# Patient Record
Sex: MALE | Race: BLACK OR AFRICAN AMERICAN | NOT HISPANIC OR LATINO | URBAN - METROPOLITAN AREA
[De-identification: names, ages, dates, MRNs, and addresses within clinical notes are randomized per-mention and may not be internally consistent; named-entity substitution may affect disease eponyms.]

---

## 2020-12-25 ENCOUNTER — EMERGENCY (EMERGENCY)
Facility: HOSPITAL | Age: 20
LOS: 0 days | Discharge: HOME | End: 2020-12-26
Attending: EMERGENCY MEDICINE | Admitting: EMERGENCY MEDICINE
Payer: SELF-PAY

## 2020-12-25 VITALS
DIASTOLIC BLOOD PRESSURE: 84 MMHG | RESPIRATION RATE: 18 BRPM | HEART RATE: 90 BPM | OXYGEN SATURATION: 100 % | TEMPERATURE: 98 F | SYSTOLIC BLOOD PRESSURE: 142 MMHG

## 2020-12-25 VITALS
RESPIRATION RATE: 18 BRPM | HEART RATE: 101 BPM | TEMPERATURE: 99 F | OXYGEN SATURATION: 97 % | SYSTOLIC BLOOD PRESSURE: 130 MMHG | WEIGHT: 119.93 LBS | DIASTOLIC BLOOD PRESSURE: 92 MMHG | HEIGHT: 69 IN

## 2020-12-25 DIAGNOSIS — M79.645 PAIN IN LEFT FINGER(S): ICD-10-CM

## 2020-12-25 DIAGNOSIS — L03.114 CELLULITIS OF LEFT UPPER LIMB: ICD-10-CM

## 2020-12-25 LAB
ALBUMIN SERPL ELPH-MCNC: 4.7 G/DL — SIGNIFICANT CHANGE UP (ref 3.5–5.2)
ALP SERPL-CCNC: 105 U/L — SIGNIFICANT CHANGE UP (ref 30–115)
ALT FLD-CCNC: 61 U/L — HIGH (ref 0–41)
ANION GAP SERPL CALC-SCNC: 14 MMOL/L — SIGNIFICANT CHANGE UP (ref 7–14)
AST SERPL-CCNC: 42 U/L — HIGH (ref 0–41)
BASOPHILS # BLD AUTO: 0.07 K/UL — SIGNIFICANT CHANGE UP (ref 0–0.2)
BASOPHILS NFR BLD AUTO: 0.5 % — SIGNIFICANT CHANGE UP (ref 0–1)
BILIRUB SERPL-MCNC: <0.2 MG/DL — SIGNIFICANT CHANGE UP (ref 0.2–1.2)
BUN SERPL-MCNC: 15 MG/DL — SIGNIFICANT CHANGE UP (ref 10–20)
CALCIUM SERPL-MCNC: 9.4 MG/DL — SIGNIFICANT CHANGE UP (ref 8.5–10.1)
CHLORIDE SERPL-SCNC: 100 MMOL/L — SIGNIFICANT CHANGE UP (ref 98–110)
CO2 SERPL-SCNC: 23 MMOL/L — SIGNIFICANT CHANGE UP (ref 17–32)
CREAT SERPL-MCNC: 1 MG/DL — SIGNIFICANT CHANGE UP (ref 0.7–1.5)
EOSINOPHIL # BLD AUTO: 0.69 K/UL — SIGNIFICANT CHANGE UP (ref 0–0.7)
EOSINOPHIL NFR BLD AUTO: 4.9 % — SIGNIFICANT CHANGE UP (ref 0–8)
GLUCOSE SERPL-MCNC: 101 MG/DL — HIGH (ref 70–99)
HCT VFR BLD CALC: 45.9 % — SIGNIFICANT CHANGE UP (ref 42–52)
HGB BLD-MCNC: 15.9 G/DL — SIGNIFICANT CHANGE UP (ref 14–18)
IMM GRANULOCYTES NFR BLD AUTO: 0.4 % — HIGH (ref 0.1–0.3)
LYMPHOCYTES # BLD AUTO: 16.4 % — LOW (ref 20.5–51.1)
LYMPHOCYTES # BLD AUTO: 2.32 K/UL — SIGNIFICANT CHANGE UP (ref 1.2–3.4)
MCHC RBC-ENTMCNC: 33.8 PG — HIGH (ref 27–31)
MCHC RBC-ENTMCNC: 34.6 G/DL — SIGNIFICANT CHANGE UP (ref 32–37)
MCV RBC AUTO: 97.5 FL — HIGH (ref 80–94)
MONOCYTES # BLD AUTO: 1.28 K/UL — HIGH (ref 0.1–0.6)
MONOCYTES NFR BLD AUTO: 9 % — SIGNIFICANT CHANGE UP (ref 1.7–9.3)
NEUTROPHILS # BLD AUTO: 9.76 K/UL — HIGH (ref 1.4–6.5)
NEUTROPHILS NFR BLD AUTO: 68.8 % — SIGNIFICANT CHANGE UP (ref 42.2–75.2)
NRBC # BLD: 0 /100 WBCS — SIGNIFICANT CHANGE UP (ref 0–0)
PLATELET # BLD AUTO: 253 K/UL — SIGNIFICANT CHANGE UP (ref 130–400)
POTASSIUM SERPL-MCNC: 4.5 MMOL/L — SIGNIFICANT CHANGE UP (ref 3.5–5)
POTASSIUM SERPL-SCNC: 4.5 MMOL/L — SIGNIFICANT CHANGE UP (ref 3.5–5)
PROT SERPL-MCNC: 7.3 G/DL — SIGNIFICANT CHANGE UP (ref 6–8)
RBC # BLD: 4.71 M/UL — SIGNIFICANT CHANGE UP (ref 4.7–6.1)
RBC # FLD: 12.3 % — SIGNIFICANT CHANGE UP (ref 11.5–14.5)
SODIUM SERPL-SCNC: 137 MMOL/L — SIGNIFICANT CHANGE UP (ref 135–146)
WBC # BLD: 14.18 K/UL — HIGH (ref 4.8–10.8)
WBC # FLD AUTO: 14.18 K/UL — HIGH (ref 4.8–10.8)

## 2020-12-25 PROCEDURE — 10061 I&D ABSCESS COMP/MULTIPLE: CPT

## 2020-12-25 PROCEDURE — 99284 EMERGENCY DEPT VISIT MOD MDM: CPT | Mod: 25

## 2020-12-25 PROCEDURE — 73140 X-RAY EXAM OF FINGER(S): CPT | Mod: 26,LT

## 2020-12-25 RX ORDER — KETOROLAC TROMETHAMINE 30 MG/ML
15 SYRINGE (ML) INJECTION ONCE
Refills: 0 | Status: DISCONTINUED | OUTPATIENT
Start: 2020-12-25 | End: 2020-12-25

## 2020-12-25 RX ADMIN — Medication 100 MILLIGRAM(S): at 21:33

## 2020-12-25 RX ADMIN — Medication 15 MILLIGRAM(S): at 21:33

## 2020-12-25 NOTE — ED PROVIDER NOTE - CARE PROVIDERS DIRECT ADDRESSES
[email or web address]

## 2020-12-25 NOTE — ED PROVIDER NOTE - PROGRESS NOTE DETAILS
ortho c/s pending recs. will draw labs, antibiose, and xray in meanwhile -[facility] ortho resident at bedside,   Dr. Chung I/D and advised pt to stay for treatment, pt prefers to go home   I had extensive discussion of Risks/Alternatives/Benefits of pursuing further medical evaluation and/or care with patient and any available family/friends; patient still electing to leave against medical advice. Patient is awake, alert, oriented and demonstrates full capacity and insight into illness. Patient aware and encouraged to return immediately to ED or nearest ED if patient decides to change mind regarding care or if patient experiences any new, worsening, or concerning symptoms. BI: pt endorsed to me by Dr. Chung. Pt incised and drained. Dressed. Ortho rec clinda which was sent.

## 2020-12-25 NOTE — ED PROVIDER NOTE - PATIENT PORTAL LINK FT
You can access the FollowMyHealth Patient Portal offered by [facility] by registering at the following website: [email or web address]. By joining [employer]’s FollowMyHealth portal, you will also be able to view your health information using other applications (apps) compatible with our system.

## 2020-12-25 NOTE — ED PROVIDER NOTE - NSFOLLOWUPINSTRUCTIONS_ED_ALL_ED_FT
Paronychia    Paronychia is an infection of the skin that surrounds a nail. It usually affects the skin around a fingernail, but it may also occur near a toenail. It often causes pain and swelling around the nail. In some cases, a collection of pus (abscess) can form near or under the nail.     This condition may develop suddenly, or it may develop gradually over a longer period. In most cases, paronychia is not serious, and it will clear up with treatment.    What are the causes?  This condition may be caused by bacteria or a fungus. These germs can enter the body through an opening in the skin, such as a cut or a hangnail.    What increases the risk?  This condition is more likely to develop in people who:  Get their hands wet often, such as those who work as dishwashers, , or nurses.  Bite their fingernails or suck their thumbs.  Trim their nails very short.  Have hangnails or injured fingertips.  Get manicures.  Have diabetes.    What are the signs or symptoms?  Symptoms of this condition include:  Redness and swelling of the skin near the nail.  Tenderness around the nail when you touch the area.  Pus-filled bumps under the skin at the base and sides of the nail (cuticle).  Fluid or pus under the nail.  Throbbing pain in the area.  How is this diagnosed?  This condition is diagnosed with a physical exam. In some cases, a sample of pus may be tested to determine what type of bacteria or fungus is causing the condition.    How is this treated?  Treatment depends on the cause and severity of your condition. If your condition is mild, it may clear up on its own in a few days or after soaking in warm water. If needed, treatment may include:  Antibiotic medicine, if your infection is caused by bacteria.  Antifungal medicine, if your infection is caused by a fungus.  A procedure to drain pus from an abscess.  Anti-inflammatory medicine (corticosteroids).  Follow these instructions at home:  Wound care     Keep the affected area clean.  Soak the affected area in warm water, if told to do so by your health care provider. You may be told to do this for 20 minutes, 2–3 times a day.   Keep the area dry when you are not soaking it.  Do not try to drain an abscess yourself.  Follow instructions from your health care provider about how to take care of the affected area. Make sure you:  Wash your hands with soap and water before you change your bandage (dressing). If soap and water are not available, use hand .  Change your dressing as told by your health care provider.  If you had an abscess drained, check the area every day for signs of infection. Check for:  Redness, swelling, or pain.  Fluid or blood.  Warmth.  Pus or a bad smell.  Medicines       Take over-the-counter and prescription medicines only as told by your health care provider.  If you were prescribed an antibiotic medicine, take it as told by your health care provider. Do not stop taking the antibiotic even if you start to feel better.  General instructions     Avoid contact with harsh chemicals.  Do not pick at the affected area.  Prevention     To prevent this condition from happening again:  Wear rubber gloves when washing dishes or doing other tasks that require your hands to get wet.  Wear gloves if your hands might come in contact with  or other chemicals.  Avoid injuring your nails or fingertips.  Do not bite your nails or tear hangnails.  Do not cut your nails very short.   Do not cut your cuticles.  Use clean nail clippers or scissors when trimming nails.  Contact a health care provider if:  Your symptoms get worse or do not improve with treatment.  You have continued or increased fluid, blood, or pus coming from the affected area.  Your finger or knuckle becomes swollen or difficult to move.  Get help right away if you have:  A fever or chills.  Redness spreading away from the affected area.  Joint or muscle pain.    Summary  Paronychia is an infection of the skin that surrounds a nail. It often causes pain and swelling around the nail. In some cases, a collection of pus (abscess) can form near or under the nail.  This condition may be caused by bacteria or a fungus. These germs can enter the body through an opening in the skin, such as a cut or a hangnail.  If your condition is mild, it may clear up on its own in a few days. If needed, treatment may include medicine or a procedure to drain pus from an abscess.  To prevent this condition from happening again, wear gloves if doing tasks that require your hands to get wet or to come in contact with chemicals. Also avoid injuring your nails or fingertips.

## 2020-12-25 NOTE — ED PROVIDER NOTE - CARE PROVIDER_API CALL
Miroslava Henry)  [facility]  [address] [place] [address], [address]  [place] [address]  Phone: [phone]  Fax: [phone]  Follow Up Time: 1-3 Days

## 2020-12-25 NOTE — ED PROVIDER NOTE - OBJECTIVE STATEMENT
20y M no pmh presenting with swelling and pain to L 2nd finger x2 weeks. Pt works in seafood industry. Can't recall foreign body or trauma to finger. Pain with passive and active movement of finger. Has not tried any interventions to reduce symptoms. No f/c/n/v. No numbness/tingling. No discharge.

## 2020-12-25 NOTE — CONSULT NOTE ADULT - ASSESSMENT
Orthopaedic Surgery Consult Note    Phone number: [phone]    19 yo Male RHD presents w/ complaint of left ring finger pain. Pt reports he noticed some irritation of his ring finger approx 2 weeks ago, but it became significantly worse over the last day or 2. Has not had any treatment for it. 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 Denies fever/chills. Denies numbness/tingling in the ring finger.    PMH/PSH: denies  Medications: denies  All: NKDA        T(C): 37.1 (12-25-20 @ 19:25), Max: 37.1 (12-25-20 @ 19:25)  HR: 76 (12-25-20 @ 23:08) (76 - 101)  BP: 130/92 (12-25-20 @ 19:25) (130/92 - 130/92)  RR: 18 (12-25-20 @ 19:25) (18 - 18)  SpO2: 97% (12-25-20 @ 19:25) (97% - 97%)    P/E:  NAD  Non-labored breathing    Right Hand  Ring finger:  Skin intact  Nail changes from biting nails  Swelling of ring finger; no fusiform swelling/erythema over dorsal ring finger   Ring finger resting in extension  TTP over dorsal ring finger; non-TTP over palmar side of ring finger/non-TTP over flexor sheath  Palpable fluctuance over dorsal ring finger  No pain w/ passive motion of ring finger  SILT radial and ulnar aspects of ring finger  AIN/PIN/U intact  Cap refill <2        Labs                        15.9   14.18 )-----------( 253      ( 25 Dec 2020 21:12 )             45.9     12-25    137  |  100  |  15  ----------------------------<  101<H>  4.5   |  23  |  1.0    Ca    9.4      25 Dec 2020 21:12    TPro  7.3  /  Alb  4.7  /  TBili  <0.2  /  DBili  x   /  AST  42<H>  /  ALT  61<H>  /  AlkPhos  105  12-25    LIVER FUNCTIONS - ( 25 Dec 2020 21:12 )  Alb: 4.7 g/dL / Pro: 7.3 g/dL / ALK PHOS: 105 U/L / ALT: 61 U/L / AST: 42 U/L / GGT: x               Imaging:  XR: left hand: no acute fx's or dislocations; swelling seen over ring finger      A/P:  19 yo Male w/ left ring finger paronychia. Pt received a dose of iv abx prior to being evaluated by ortho. Pt ring finger of I&D'd by ED resident w/ ortho present. Large amount of purulence expressed. Cultures obtained and sent. Small wick placed. Soft dressing applied. Recommended pt stay in hospital for another dose of iv abx and to be monitored for improvement of symptoms. Pt expressed he does not want to be admitted, but agreed to stay in observation for a few hours.     If d/c'd, pt can pull wick out of finger tomorrow and begin warm soapy soaks TID; discussed and explained to pt what he needs to do.  Warm soapy soaks TID and soft dressing place afterwards  PO abx for 7 days  Pain control  Elevate hand  F/u w/ Dr. Henry next week  If d/c'd, pt instructed to return to ED w/ uncontrolled pain/bleeding/fever/chills/numbness/tingling/cool extremity/inability to move extremity             Orthopaedic Surgery Consult Note    Phone number: [phone]    21 yo Male RHD presents w/ complaint of left ring finger pain. Pt reports he noticed some irritation of his ring finger approx 2 weeks ago, but it became significantly worse over the last day or 2. Has not had any treatment for it. 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 Denies fever/chills. Denies numbness/tingling in the ring finger.    PMH/PSH: denies  Medications: denies  All: NKDA        T(C): 37.1 (12-25-20 @ 19:25), Max: 37.1 (12-25-20 @ 19:25)  HR: 76 (12-25-20 @ 23:08) (76 - 101)  BP: 130/92 (12-25-20 @ 19:25) (130/92 - 130/92)  RR: 18 (12-25-20 @ 19:25) (18 - 18)  SpO2: 97% (12-25-20 @ 19:25) (97% - 97%)    P/E:  NAD  Non-labored breathing    Right Hand  Ring finger:  Skin intact  Nail changes from biting nails  Swelling of ring finger; no fusiform swelling/erythema over dorsal ring finger   Ring finger resting in extension  TTP over dorsal ring finger; non-TTP over palmar side of ring finger/non-TTP over flexor sheath  Palpable fluctuance over dorsal ring finger  No pain w/ passive motion of ring finger  SILT radial and ulnar aspects of ring finger  AIN/PIN/U intact  Cap refill <2        Labs                        15.9   14.18 )-----------( 253      ( 25 Dec 2020 21:12 )             45.9     12-25    137  |  100  |  15  ----------------------------<  101<H>  4.5   |  23  |  1.0    Ca    9.4      25 Dec 2020 21:12    TPro  7.3  /  Alb  4.7  /  TBili  <0.2  /  DBili  x   /  AST  42<H>  /  ALT  61<H>  /  AlkPhos  105  12-25    LIVER FUNCTIONS - ( 25 Dec 2020 21:12 )  Alb: 4.7 g/dL / Pro: 7.3 g/dL / ALK PHOS: 105 U/L / ALT: 61 U/L / AST: 42 U/L / GGT: x               Imaging:  XR: left hand: no acute fx's or dislocations; swelling seen over ring finger      A/P:  21 yo Male w/ left ring finger paronychia. Pt had no pain over flexor sheath, no fusiform swelling, no pain w/ passive stretch, and finger resting in extension. Pt received a dose of iv abx prior to being evaluated by ortho. Pt ring finger of I&D'd by ED resident w/ ortho present. Large amount of purulence expressed. Cultures obtained and sent. Small wick placed. Soft dressing applied. Recommended pt stay in hospital for another dose of iv abx and to be monitored for improvement of symptoms. Pt expressed he does not want to be admitted, but agreed to stay in observation for a few hours. Would keep NPO while in the hospital.    If d/c'd, pt can pull wick out of finger tomorrow and begin warm soapy soaks TID; discussed and explained to pt what he needs to do.  Warm soapy soaks TID and soft dressing place afterwards  PO abx for 7 days  Pain control  Elevate hand  F/u w/ Dr. Henry next week  If d/c'd, pt instructed to return to ED w/ uncontrolled pain/bleeding/fever/chills/numbness/tingling/cool extremity/inability to move extremity

## 2020-12-25 NOTE — ED PROVIDER NOTE - NS ED ROS FT
Eyes:  No visual changes, eye pain or discharge.  ENMT:  No hearing changes, pain, no sore throat or runny nose, no difficulty swallowing  Cardiac:  No chest pain, SOB or edema. No chest pain with exertion.  Respiratory:  No cough or respiratory distress. No hemoptysis. No history of asthma or RAD.  GI:  No nausea, vomiting, diarrhea or abdominal pain.  :  No dysuria, frequency or burning.  MS:  see HPI  Neuro:  No headache or weakness.  No LOC.  Skin:  No skin rash.   Endocrine: No history of thyroid disease or diabetes.

## 2020-12-25 NOTE — ED PROVIDER NOTE - ATTENDING CONTRIBUTION TO CARE
20 M to ED with finger pain, redness swelling over dorsum of 2nd pip, dip, painful with flexion/extension and + fluctuance. Denies trauma or fall but had worked with seafood in the past.

## 2020-12-25 NOTE — ED PROVIDER NOTE - PHYSICAL EXAMINATION
CONSTITUTIONAL: Well-developed; well-nourished; in no acute distress.   SKIN: warm, dry  HEAD: Normocephalic; atraumatic.  EYES: no conjunctival injection. PERRL.   ENT: No nasal discharge; airway clear.  NECK: Supple; non tender.  CARD: S1, S2 normal; no murmurs, gallops, or rubs. Regular rate and rhythm.   RESP: No wheezes, rales or rhonchi.  ABD: soft ntnd  EXT: diffusely swollen l 2nd finger, pain with active and passive extension and flexion of finger.   LYMPH: No acute cervical adenopathy.  NEURO: Alert, oriented, grossly unremarkable  PSYCH: Cooperative, appropriate.

## 2020-12-26 VITALS
RESPIRATION RATE: 20 BRPM | HEART RATE: 117 BPM | TEMPERATURE: 98 F | DIASTOLIC BLOOD PRESSURE: 89 MMHG | SYSTOLIC BLOOD PRESSURE: 153 MMHG | OXYGEN SATURATION: 100 % | WEIGHT: 130.07 LBS | HEIGHT: 69 IN

## 2020-12-26 VITALS
TEMPERATURE: 98 F | RESPIRATION RATE: 18 BRPM | SYSTOLIC BLOOD PRESSURE: 140 MMHG | DIASTOLIC BLOOD PRESSURE: 95 MMHG | OXYGEN SATURATION: 100 % | HEART RATE: 72 BPM
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
## 2020-12-26 DIAGNOSIS — Z79.899 OTHER LONG TERM (CURRENT) DRUG THERAPY: ICD-10-CM
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
## 2020-12-26 DIAGNOSIS — S52.502A UNSPECIFIED FRACTURE OF THE LOWER END OF LEFT RADIUS, INITIAL ENCOUNTER FOR CLOSED FRACTURE: ICD-10-CM

## 2020-12-26 DIAGNOSIS — M25.539 PAIN IN UNSPECIFIED WRIST: ICD-10-CM

## 2020-12-26 PROCEDURE — 73070 X-RAY EXAM OF ELBOW: CPT | Mod: 26,RT

## 2020-12-26 PROCEDURE — 73110 X-RAY EXAM OF WRIST: CPT | Mod: 26,RT

## 2020-12-26 PROCEDURE — 73130 X-RAY EXAM OF HAND: CPT | Mod: 26,RT

## 2020-12-26 PROCEDURE — 73090 X-RAY EXAM OF FOREARM: CPT | Mod: 26,RT,76

## 2020-12-26 PROCEDURE — 99284 EMERGENCY DEPT VISIT MOD MDM: CPT

## 2020-12-26 RX ORDER — OXYCODONE AND ACETAMINOPHEN 5; 325 MG/1; MG/1
1 TABLET ORAL ONCE
Refills: 0 | Status: DISCONTINUED | OUTPATIENT
Start: 2020-12-26 | End: 2020-12-26

## 2020-12-26 RX ADMIN — OXYCODONE AND ACETAMINOPHEN 1 TABLET(S): 5; 325 TABLET ORAL at 11:10

## 2020-12-26 RX ADMIN — OXYCODONE AND ACETAMINOPHEN 1 TABLET(S): 5; 325 TABLET ORAL at 14:01

## 2020-12-26 NOTE — ED PROVIDER NOTE - CLINICAL SUMMARY MEDICAL DECISION MAKING FREE TEXT BOX
Patient presented with left wrist pain s/p fall. Otherwise afebrile, HD stable, neurovascularly intact, but (+) deformity noted to left wrist. Obtained xrays which confirmed (+) distal radius fx. Consulted ortho who evaluated xrays and patient in ED and splinted patient. Recommended discharge with outpatient ortho follow up. Patient agreeable with plan and pain controlled in ED. Ambulatory, tolerates PO. Agrees to return to ED for any new or worsening symptoms.

## 2020-12-26 NOTE — ED PROVIDER NOTE - ATTENDING CONTRIBUTION TO CARE
20 year old male, denies pmhx, presenting s/p mechanical fall on outstretched hand. Denies head trauma or LOC but is currently complaining of left wrist pain described as sharp, non-radiating, worse with movement, relieved with rest, moderate severity. States he had a fracture of the left forearm in that region 6 years ago which has since healed. Otherwise denies other injuries or complaints.    Vital Signs: I have reviewed the initial vital signs.  Constitutional: NAD, well-nourished, appears stated age, no acute distress.  HEENT: Airway patent, moist MM, no erythema/swelling/deformity of oral structures. EOMI, PERRLA.  CV: regular rate, regular rhythm, well-perfused extremities, 2+ b/l DP and radial pulses equal.  Lungs: BCTA, no increased WOB.  ABD: NTND, no guarding or rebound, no pulsatile mass, no hernias.   MSK: Neck supple, nontender, nl ROM, no stepoff. Chest nontender. Back nontender in TLS spine or to b/l bony structures or flanks. (+) left wrist deformity, but other ext nontender, nl rom, no deformity.   INTEG: Skin warm, dry, no rash.  NEURO: A&Ox3, normal strength, nl sensation throughout, normal speech.   PSYCH: Calm, cooperative, normal affect and interaction.    Neurovascularly intact. Will obtain xrays, likely consult ortho, pain control, re-eval.

## 2020-12-26 NOTE — ED PROVIDER NOTE - NS ED ROS FT
Constitutional: (-) fever (-) malaise (-) diaphoresis (-) chills   Neck: (-) neck pain (-) neck stiffness  Cardiac: (-) chest pain  (-) palpitations (-) syncope (-) edema  Respiratory: (-) cough (-) hemoptysis (-) SOB (-) NEELY  GI: (-) nausea (-) vomiting (-) diarrhea (-) abdominal pain  MS: (-) back pain (-) myalgia (-) muscle weakness (-)  joint pain  Neuro: (-) headache (-) dizziness (-) numbness/tingling to extremities B/L (-) weakness (-) LOC (-) head injury   Skin: (-) rash (-) laceration (-)ecchymosis    Except as documented in the HPI, all other systems are negative.

## 2020-12-26 NOTE — ED PROVIDER NOTE - CARE PROVIDER_API CALL
Bertrand Mensah)  Orthopaedic Surgery  [address] [place] [address]  Phone: [phone]  Fax: [phone]  Follow Up Time: 4-6 Days    Justino Wen  ORTHOPAEDIC SURGERY  [address] [place] [address]  Phone: [phone]  Fax: [phone]  Follow Up Time: 4-6 Days

## 2020-12-26 NOTE — ED PROVIDER NOTE - PATIENT PORTAL LINK FT
You can access the FollowMyHealth Patient Portal offered by [facility] by registering at the following website: [email or web address]. By joining [employer]’s FollowMyHealth portal, you will also be able to view your health information using other applications (apps) compatible with our system.

## 2020-12-26 NOTE — CONSULT NOTE ADULT - SUBJECTIVE AND OBJECTIVE BOX
Orthopaedic Surgery Consult Note    JEN OBRIEN  [id number]    19yo Male with right forearm pain + deformtiy after slip and fall this AM. Pt reports that he was treated for I&D of the left RF yesterday for abscess. He fell this AM and broke his fall with his right hand. Denies any numbness / tingling. Does report history of fracture to the RUE in 2014 treated in cast, which he removed early. He has had some pain in the right forearm for the past few weeks. no pain in the other extremities    PMH/PSH  ^ARM INJURY    MEWS Score    No pertinent past medical history    ARM INJURY    0    [email or web address]        Medications  oxycodone    5 mG/acetaminophen 325 mG 1 Tablet(s) Oral Once      Allergies  No Known Allergies        T(C): 36.8 (12-26-20 @ 10:20), Max: 37.1 (12-25-20 @ 19:25)  HR: 117 (12-26-20 @ 10:20) (76 - 117)  BP: 153/89 (12-26-20 @ 10:20) (130/92 - 153/89)  RR: 20 (12-26-20 @ 10:20) (18 - 20)  SpO2: 100% (12-26-20 @ 10:20) (97% - 100%)    P/E:  AOx3, NAD  Non-labored breathing    RUE  Skin intact  Extension deformity at the distal forearm  Mild swelling, no ecchymosis  ROM limited 2/2 pain  TTP over distal forearm  Compartments soft and compressible, no pain w/ passive stretch of digits  SILT Ax/LABCN/R/M/U  AIN/PIN/U intact  Radial pulse 2+, cap refill <2      Labs                        15.9   14.18 )-----------( 253      ( 25 Dec 2020 21:12 )             45.9     12-25    137  |  100  |  15  ----------------------------<  101<H>  4.5   |  23  |  1.0    Ca    9.4      25 Dec 2020 21:12    TPro  7.3  /  Alb  4.7  /  TBili  <0.2  /  DBili  x   /  AST  42<H>  /  ALT  61<H>  /  AlkPhos  105  12-25    LIVER FUNCTIONS - ( 25 Dec 2020 21:12 )  Alb: 4.7 g/dL / Pro: 7.3 g/dL / ALK PHOS: 105 U/L / ALT: 61 U/L / AST: 42 U/L / GGT: x               Imaging:  XR Right forearm - displaced fracutre of the distal 1/3rd of the right radius with abundant callus formation consistent with chronic fracture    A/P:  19yo Male with right distal 1/3rd radius fracture s/p closed reduction and splinting. He was told that he will likely need surgery for this injury as an outpt. No further acute orthopedic planned. He can follow up in clinic to discuss surgery    Weight bearing: KATHLEEN GROSS in splint  Pain control  Ice/elevation  Cast/Splint care instructions provided  Return to clinic: Orthopaedic Fracture or Hand with Dr. Mensah / Dr. García / Dr. Wen, please call [phone] to schedule an appointment for next week  Continue care for LUE - pt is planning on starting TID warm soaks tomorrow. Educated about infection care

## 2020-12-26 NOTE — ED PROVIDER NOTE - OBJECTIVE STATEMENT
19 yo M presenting to the ED c/o right wrist pain x few hours s/p mechanical fall on outstretched hand, pt was in the dark and fell forward breaking his fall with his right hand. Pt took Tylenol earlier this am, no relief of pain. Pt reports severe, constant, throbbing pain localized to right wrist and forearm, worse with palpation and movement. Pt reports he also fractured his right forearm 6 years ago. Pt was seen in the ED yesterday for I&D L 4th digit, denies any pain to the site of I&D. Pt denies right hand pain, right elbow pain, numbness/tingling, ecchymosis, lacerations, abrasions, head trauma, LOC, dizziness, chest pain, sob. 19 yo M presenting to the ED c/o left wrist pain x few hours s/p mechanical fall on outstretched hand, pt was in the dark and fell forward breaking his fall with his left hand. Pt took Tylenol earlier this am, no relief of pain. Pt reports severe, constant, throbbing pain localized to left wrist and forearm, worse with palpation and movement. Pt reports he also fractured his left forearm 6 years ago. Pt was seen in the ED yesterday for I&D L 4th digit, denies any pain to the site of I&D. Pt denies left hand pain, left elbow pain, numbness/tingling, ecchymosis, lacerations, abrasions, head trauma, LOC, dizziness, chest pain, sob.

## 2020-12-26 NOTE — ED PROVIDER NOTE - PHYSICAL EXAMINATION
GENERAL: Well-nourished, Well-developed. NAD.  HEAD: normocephalic, atraumatic  Eyes: PERRLA, EOMI. No asymmetry. No nystagmus. No conjunctival injection. Non-icteric sclera.  Neck: Supple. No LAD. No cervical midline TTP. No paravertebral TTP to traps. FROM  MSK: (+)visible swelling and deformity to right forearm, dorsally displaced, TTP right forearm and right wrist, LROM right wrist due to pain and swelling. No ecchymosis or erythema. Able to move all fingers. No TTP or swelling over right elbow. No midline spinal TTP. FROM of back with flexion and extension.  Skin: No ecchymosis or erythema. Warm, Dry. No rashes or lesions. Good cap refill < 2 sec B/L.  EXT: Radial and pedal pulses present B/L.   Neuro: AA&O x 3. Sensation grossly intact. Strength 5/5 B/L. Gait within normal limits. GENERAL: Well-nourished, Well-developed. NAD.  HEAD: normocephalic, atraumatic  Eyes: PERRLA, EOMI. No asymmetry. No nystagmus. No conjunctival injection. Non-icteric sclera.  Neck: Supple. No LAD. No cervical midline TTP. No paravertebral TTP to traps. FROM  MSK: (+)visible swelling and deformity to left forearm, dorsally displaced, TTP left forearm and left wrist, LROM left wrist due to pain and swelling. No ecchymosis or erythema. Able to move all fingers. No TTP or swelling over left elbow. No midline spinal TTP. FROM of back with flexion and extension.  Skin: No ecchymosis or erythema. Warm, Dry. No rashes or lesions. Good cap refill < 2 sec B/L.  EXT: Radial and pedal pulses present B/L.   Neuro: AA&O x 3. Sensation grossly intact. Strength 5/5 B/L. Gait within normal limits.

## 2020-12-26 NOTE — PROGRESS NOTE ADULT - SUBJECTIVE AND OBJECTIVE BOX
Orthopedic Update Note    Was notified by radiologist while in the operating room that there was concern for possible DRUJ injury and while the wrist appeared well reduced on post-reduction XR, a post-reduction wrist XR should be obtained prior to discharge. The patient was discharged prior to wrist XR being obtained.    Discussed with Dr. Mensah. Post-reduction XR shows well reduced radius fracture and DRUJ appears reduced. Plan is for follow-up wrist and forearm XR to be obtained at follow up to confirm reduction.

## 2020-12-26 NOTE — ED PROVIDER NOTE - NSFOLLOWUPINSTRUCTIONS_ED_ALL_ED_FT
Radial Fracture       A radial fracture is a break in the radius bone. The radius is a bone in the forearm, on the same side as the thumb. The forearm is the part of the arm that is between the elbow and the wrist. A radial fracture near the wrist (distal radialfracture) is the most common type of broken arm. A fracture can also occur near the elbow (radial head fracture).      What are the causes?  The most common cause of a radial fracture is falling with the arm outstretched. Other causes include:  •An accident, such as a car or bike accident.      •A hard, direct hit to the forearm.        What increases the risk?  You may be at greater risk for a radial fracture if you:  •Are female.      •Are an older adult.      •Play contact sports.      •Have a condition that causes your bones to become thin and brittle (osteoporosis).        What are the signs or symptoms?  A radial fracture causes pain immediately after the injury. Other signs and symptoms may include:  •An abnormal bend or bump in the arm (deformity).      •Swelling.      •Bruising.      •Numbness or tingling in your arm and hand.      •Limited movement of your arm and hand.        How is this diagnosed?  This condition may be diagnosed based on:  •Your symptoms and medical history.      •A physical exam.      •An X-ray.        How is this treated?  Treatment depends on how severe your fracture is, where it is, and how the pieces of the broken bone line up with each other (alignment).  •The first step may be for you to wear a temporary splint for a few days, until your swelling goes down. After the swelling goes down, you may get a cast, get a different type of splint, or have surgery.      •If your broken bone is in good alignment, you will need to wear a splint or cast for up to 6 weeks.    •If your broken bone is not aligned (is displaced), your health care provider will need to align the bone pieces. After alignment, you will need to wear a splint or cast for up to 6 weeks. To align your broken bone, your health care provider may:  •Move the bones back into position without surgery (closed reduction).      •Perform surgery to align the fracture and fix the bone pieces into place with metal screws, plates, or wires (open reduction and internal fixation, ORIF).      •Perform surgery to align the fracture and fix the bone pieces into place with pins that are attached to a stabilizing bar outside your skin (external fixation).      Treatment may also include:  •Having your cast changed after 2–3 weeks.      •Physical therapy.      •Follow-up visits and X-rays to make sure you are healing.        Follow these instructions at home:    If you have a splint:     •Wear it as told by your health care provider. Remove it only as told by your health care provider.       •Loosen the splint if your fingers tingle, become numb, or turn cold and blue.       •Keep the splint clean and dry.      If you have a cast:     • Do not stick anything inside the cast to scratch your skin. Doing that increases your risk for infection.       •Check the skin around the cast every day. Tell your health care provider about any concerns.       •You may put lotion on dry skin around the edges of the cast. Do not put lotion on the skin underneath the cast.      •Keep the cast clean and dry.      Bathing     • Do not take baths, swim, or use a hot tub until your health care provider approves. Ask your health care provider if you may take showers. You may only be allowed to take sponge baths.    •If your splint or cast is not waterproof:  •Do not let it get wet.      •Cover it with a watertight covering when you take a bath or a shower.        Activity     •  Do not lift anything with your injured arm.      • Do not use the injured arm to support your body weight until your health care provider says that you can.      •Ask your health care provider what activities are safe for you during recovery, and ask what activities you need to avoid.        Managing pain, stiffness, and swelling    •If directed, put ice on painful areas:   •If you have a removable splint, remove it as told by your health care provider.       •Put ice in a plastic bag.       •Place a towel between your skin and the bag, or between your cast and the bag.      •Leave the ice on for 20 minutes, 2–3 times a day.        • Move your fingers often to avoid stiffness and to lessen swelling.       •Raise (elevate) your arm above the level of your heart while you are sitting or lying down.      General instructions     • Do not put pressure on any part of the cast or splint until it is fully hardened, if applicable. This may take several hours.       •Take over-the-counter and prescription medicines only as told by your health care provider.      •  Do not drive until your health care provider approves. You should not drive or use heavy machinery while taking prescription pain medicine.       • Do not use any products that contain nicotine or tobacco, such as cigarettes and e-cigarettes. These can delay bone healing. If you need help quitting, ask your health care provider.      •Keep all follow-up visits as told by your health care provider. This is important.        Contact a health care provider if you have:    •Pain that does not get better with medicine.      •Swelling that gets worse.      •A bad smell coming from your cast.        Get help right away if:    •You cannot move your fingers.      •You have severe pain.    •Your fingers or your hand:  •Become numb, cold, or pale.      •Turn a bluish color.          Summary    •A radial fracture is a break in the radius bone. The radius is in the forearm, on the same side as the thumb.      •Treatment depends on how severe your fracture is, where it is, and how the pieces of the broken bone line up with each other.      •A splint or cast may be needed to help the fracture heal. A more severe break may require surgery.      This information is not intended to replace advice given to you by your health care provider. Make sure you discuss any questions you have with your health care provider.

## 2020-12-26 NOTE — ED POST DISCHARGE NOTE - DETAILS
Discussed steve results with pt- ulnar styloid fracture. Pt instructed to come back to ED immediately for dedicated wrist xray and splint reeval.

## 2020-12-26 NOTE — ED PROVIDER NOTE - PROVIDER TOKENS
PROVIDER:[TOKEN:[[id number]],FOLLOWUP:[4-6 Days]],PROVIDER:[TOKEN:[[id number]],FOLLOWUP:[4-6 Days]]

## 2020-12-26 NOTE — ED PROVIDER NOTE - PROGRESS NOTE DETAILS
Patients displaced fx reduced by orthopedics. As per orthopedics patient can go home and fu with ortho in 1 week.

## 2020-12-28 LAB
-  AMPICILLIN/SULBACTAM: SIGNIFICANT CHANGE UP
-  CEFAZOLIN: SIGNIFICANT CHANGE UP
-  CLINDAMYCIN: SIGNIFICANT CHANGE UP
-  ERYTHROMYCIN: SIGNIFICANT CHANGE UP
-  GENTAMICIN: SIGNIFICANT CHANGE UP
-  OXACILLIN: SIGNIFICANT CHANGE UP
-  PENICILLIN: SIGNIFICANT CHANGE UP
-  RIFAMPIN: SIGNIFICANT CHANGE UP
-  TETRACYCLINE: SIGNIFICANT CHANGE UP
-  TRIMETHOPRIM/SULFAMETHOXAZOLE: SIGNIFICANT CHANGE UP
-  VANCOMYCIN: SIGNIFICANT CHANGE UP
METHOD TYPE: SIGNIFICANT CHANGE UP

## 2020-12-31 LAB
CULTURE RESULTS: SIGNIFICANT CHANGE UP
ORGANISM # SPEC MICROSCOPIC CNT: SIGNIFICANT CHANGE UP
ORGANISM # SPEC MICROSCOPIC CNT: SIGNIFICANT CHANGE UP
SPECIMEN SOURCE: SIGNIFICANT CHANGE UP

## 2021-03-12 PROBLEM — Z00.00 ENCOUNTER FOR PREVENTIVE HEALTH EXAMINATION: Status: ACTIVE | Noted: 2021-03-12

## 2022-03-21 NOTE — ED PROVIDER NOTE - CARE PROVIDERS DIRECT ADDRESSES
Sent   [email or web address]

## 2022-08-02 NOTE — ED ADULT NURSE NOTE - ALCOHOL PRE SCREEN (AUDIT - C)
Show Applicator Variable?: Yes Duration Of Freeze Thaw-Cycle (Seconds): 3 Statement Selected Number Of Freeze-Thaw Cycles: 1 freeze-thaw cycle Application Tool (Optional): Cry-AC Render Note In Bullet Format When Appropriate: No Post-Care Instructions: I reviewed with the patient in detail post-care instructions. Patient is to wear sunprotection, and avoid picking at any of the treated lesions. Pt may apply Vaseline to crusted or scabbing areas.\\nWHAT TO EXPECT AFTER CRYOSURGERY (LIQUID NITROGEN) TREATMENT\\n\\n\\n Liquid Nitrogen is a very cold gas. In this liquid state it has a temperature of 320 degrees below zero Fahrenheit. Dermatologists use liquid nitrogen to treat certain skin growths such as warts, seborrheic and actinic keratoses, and a whole variety of other skin tumors. These skin growths are destroyed by the freezing action of this agent. With cryosurgery we have the advantage of being able to treat many skin growths and leave very little scarring. \\n\\n From a few hours to a few days after treatment, the area may blister, turn black, or form a scab. This is a desirable result. In some, no reaction is apparent.\\n\\n 1. You are allowed to get the area wet even immediately after treatment.\\n\\n 2. Most person have little or no pain from this treatment. You may have some swelling about the eyes, if cyrotherapy is performed on the forehead or temple.\\n\\n 3. It is not necessary to cover the area with a bandage. In fact, this is undesirable. Things heal better if left open to the air. You should protect the area from injury as much as possible. \\n\\n 4. Painful large blisters (even blisters filled with blood) can occur at times. These can be opened and the fluid drained out to relieve the pain. This may be done with a needle which has been cleaned with alcohol. \\n\\n 5. As the treated area heals the unwanted skin growth will fall off. This will take several days to weeks depending on the size and nature of the growth treated, the location on the skn and the way your body heals. \\n\\n 6. Allow the growth to fall off by itself - don't pick it or pull it off.\\n\\n 7. When the growth does come off, the skin underneath will be somewhat red. As time passes it will assume the color of normal skin. Don't bandage, pick at or apply any medications to the site after the growth has fallen off. The area may be sensitive to touch and be itchy as it heals. This is normal and it may take some time before it is exactly like the skin around it once more. \\n\\n\\n If you have any questions or concerns, please contact our office:         [facility] [phone] Consent: The patient's consent was obtained including but not limited to risks of crusting, scabbing, blistering, scarring, darker or lighter pigmentary change, recurrence, incomplete removal and infection. Detail Level: Detailed
